# Patient Record
Sex: MALE | Race: WHITE | NOT HISPANIC OR LATINO | Employment: OTHER | URBAN - METROPOLITAN AREA
[De-identification: names, ages, dates, MRNs, and addresses within clinical notes are randomized per-mention and may not be internally consistent; named-entity substitution may affect disease eponyms.]

---

## 2021-08-09 ENCOUNTER — TRANSFERRED RECORDS (OUTPATIENT)
Dept: HEALTH INFORMATION MANAGEMENT | Facility: CLINIC | Age: 66
End: 2021-08-09
Payer: MEDICARE

## 2021-08-09 LAB
ALT SERPL-CCNC: 218 U/L (ref 18–65)
AST SERPL-CCNC: 148 U/L (ref 10–40)
CREATININE (EXTERNAL): 0.84 MG/DL (ref 0.8–1.5)
GFR ESTIMATED (EXTERNAL): >60 ML/MIN/1.73M2
GLUCOSE (EXTERNAL): 90 MG/DL (ref 60–99)
POTASSIUM (EXTERNAL): 3.8 MMOL/L (ref 3.5–5.1)

## 2021-08-23 ENCOUNTER — TRANSFERRED RECORDS (OUTPATIENT)
Dept: HEALTH INFORMATION MANAGEMENT | Facility: CLINIC | Age: 66
End: 2021-08-23
Payer: MEDICARE

## 2021-09-08 ENCOUNTER — TRANSFERRED RECORDS (OUTPATIENT)
Dept: HEALTH INFORMATION MANAGEMENT | Facility: CLINIC | Age: 66
End: 2021-09-08
Payer: MEDICARE

## 2021-09-08 LAB
ALT SERPL-CCNC: 118 U/L (ref 18–65)
AST SERPL-CCNC: 86 U/L (ref 10–40)
CHOLESTEROL (EXTERNAL): 156 MG/DL
CREATININE (EXTERNAL): 0.84 MG/DL (ref 0.8–1.5)
GFR ESTIMATED (EXTERNAL): >60 ML/MIN/1.73M2
GLUCOSE (EXTERNAL): 87 MG/DL (ref 60–99)
HDLC SERPL-MCNC: 52 MG/DL
LDL CHOLESTEROL CALCULATED (EXTERNAL): 88 MG/DL
NON HDL CHOLESTEROL (EXTERNAL): 104 MG/DL
POTASSIUM (EXTERNAL): 4.2 MMOL/L (ref 3.5–5.1)
TRIGLYCERIDES (EXTERNAL): 78 MG/DL

## 2021-10-04 LAB
ALT SERPL-CCNC: 121 U/L (ref 18–65)
AST SERPL-CCNC: 77 U/L (ref 10–40)
CREATININE (EXTERNAL): 0.8 MG/DL (ref 0.8–1.5)
GFR ESTIMATED (EXTERNAL): >60 ML/MIN/1.73M2
GLUCOSE (EXTERNAL): 103 MG/DL (ref 60–99)
POTASSIUM (EXTERNAL): 3.8 MMOL/L (ref 3.5–5.1)

## 2021-11-19 ENCOUNTER — TRANSFERRED RECORDS (OUTPATIENT)
Dept: HEALTH INFORMATION MANAGEMENT | Facility: CLINIC | Age: 66
End: 2021-11-19
Payer: MEDICARE

## 2022-01-31 NOTE — TELEPHONE ENCOUNTER
RECORDS RECEIVED FROM:  Self   Appt Date: 03.03.2022   NOTES STATUS DETAILS   OFFICE NOTES from other specialists Received 03.02.2022 [facility]   MEDICATION LIST Care Everywhere    LABS     HEPATIC PANEL (LIVER PANEL) Received 03.02.2022   BASIC METABOLIC PANEL Received 03.02.2022   COMPLETE METABOLIC PANEL Received 03.02.2022   COMPLETE BLOOD COUNT (CBC) Received 03.02.2022     Action 02.15.2022 RM   Action Taken Called the patient to verify where to get records called [phone] [facility] in [place] called [phone],  Was told to fax request [phone] pending     Action 03.01.2022 RM   Action Taken Called [phone] spoke to a rep who states she will be faxing records over shortly. Records received sent to HIM to be scanned into chart.

## 2022-02-22 ENCOUNTER — TRANSCRIBE ORDERS (OUTPATIENT)
Dept: OTHER | Age: 67
End: 2022-02-22
Payer: MEDICARE

## 2022-02-22 ENCOUNTER — DOCUMENTATION ONLY (OUTPATIENT)
Dept: OTHER | Age: 67
End: 2022-02-22
Payer: MEDICARE

## 2022-02-22 DIAGNOSIS — R91.8 PULMONARY NODULES: Primary | ICD-10-CM

## 2022-02-22 NOTE — PROGRESS NOTES
Action 02/22/2022@ 1127AM CDK   Action Taken CT'S FROM 10/21/2020 TO 07/01/2021 REQUESTED FROM MONSERRAT DOOLEY

## 2022-02-24 DIAGNOSIS — R91.8 PULMONARY NODULES: Primary | ICD-10-CM

## 2022-02-25 NOTE — PROGRESS NOTES
Action February 25, 2022 10:47 AM ABT   Action Taken Images from [place] received and resolve to PACS by another user

## 2022-03-01 DIAGNOSIS — R79.89 ELEVATED LFTS: ICD-10-CM

## 2022-03-01 DIAGNOSIS — Z11.59 ENCOUNTER FOR SCREENING FOR OTHER VIRAL DISEASES: Primary | ICD-10-CM

## 2022-03-03 ENCOUNTER — ANCILLARY PROCEDURE (OUTPATIENT)
Dept: CT IMAGING | Facility: CLINIC | Age: 67
End: 2022-03-03
Attending: CLINICAL NURSE SPECIALIST
Payer: COMMERCIAL

## 2022-03-03 ENCOUNTER — OFFICE VISIT (OUTPATIENT)
Dept: GASTROENTEROLOGY | Facility: CLINIC | Age: 67
End: 2022-03-03
Attending: PHYSICIAN ASSISTANT
Payer: MEDICARE

## 2022-03-03 ENCOUNTER — PRE VISIT (OUTPATIENT)
Dept: GASTROENTEROLOGY | Facility: CLINIC | Age: 67
End: 2022-03-03
Payer: MEDICARE

## 2022-03-03 ENCOUNTER — LAB (OUTPATIENT)
Dept: LAB | Facility: CLINIC | Age: 67
End: 2022-03-03
Attending: PHYSICIAN ASSISTANT
Payer: COMMERCIAL

## 2022-03-03 VITALS
SYSTOLIC BLOOD PRESSURE: 91 MMHG | TEMPERATURE: 98.3 F | BODY MASS INDEX: 25.99 KG/M2 | DIASTOLIC BLOOD PRESSURE: 59 MMHG | WEIGHT: 161.7 LBS | OXYGEN SATURATION: 91 % | RESPIRATION RATE: 18 BRPM | HEART RATE: 88 BPM | HEIGHT: 66 IN

## 2022-03-03 DIAGNOSIS — R91.8 PULMONARY NODULES: ICD-10-CM

## 2022-03-03 DIAGNOSIS — R79.89 ELEVATED LFTS: ICD-10-CM

## 2022-03-03 DIAGNOSIS — Z11.59 ENCOUNTER FOR SCREENING FOR OTHER VIRAL DISEASES: ICD-10-CM

## 2022-03-03 DIAGNOSIS — R79.89 ELEVATED LFTS: Primary | ICD-10-CM

## 2022-03-03 LAB
ALBUMIN SERPL-MCNC: 3.5 G/DL (ref 3.4–5)
ALP SERPL-CCNC: 91 U/L (ref 40–150)
ALT SERPL W P-5'-P-CCNC: 33 U/L (ref 0–70)
ANION GAP SERPL CALCULATED.3IONS-SCNC: 4 MMOL/L (ref 3–14)
AST SERPL W P-5'-P-CCNC: 22 U/L (ref 0–45)
BILIRUB DIRECT SERPL-MCNC: 0.2 MG/DL (ref 0–0.2)
BILIRUB SERPL-MCNC: 0.5 MG/DL (ref 0.2–1.3)
BUN SERPL-MCNC: 12 MG/DL (ref 7–30)
CALCIUM SERPL-MCNC: 8.6 MG/DL (ref 8.5–10.1)
CHLORIDE BLD-SCNC: 109 MMOL/L (ref 94–109)
CO2 SERPL-SCNC: 28 MMOL/L (ref 20–32)
CREAT SERPL-MCNC: 0.81 MG/DL (ref 0.66–1.25)
ERYTHROCYTE [DISTWIDTH] IN BLOOD BY AUTOMATED COUNT: 12.6 % (ref 10–15)
GFR SERPL CREATININE-BSD FRML MDRD: >90 ML/MIN/1.73M2
GLUCOSE BLD-MCNC: 129 MG/DL (ref 70–99)
HCT VFR BLD AUTO: 44.7 % (ref 40–53)
HGB BLD-MCNC: 15 G/DL (ref 13.3–17.7)
MCH RBC QN AUTO: 32.1 PG (ref 26.5–33)
MCHC RBC AUTO-ENTMCNC: 33.6 G/DL (ref 31.5–36.5)
MCV RBC AUTO: 96 FL (ref 78–100)
PLATELET # BLD AUTO: 253 10E3/UL (ref 150–450)
POTASSIUM BLD-SCNC: 3.7 MMOL/L (ref 3.4–5.3)
PROT SERPL-MCNC: 7.1 G/DL (ref 6.8–8.8)
RADIOLOGIST FLAGS: NORMAL
RBC # BLD AUTO: 4.68 10E6/UL (ref 4.4–5.9)
SODIUM SERPL-SCNC: 141 MMOL/L (ref 133–144)
WBC # BLD AUTO: 5.4 10E3/UL (ref 4–11)

## 2022-03-03 PROCEDURE — 87340 HEPATITIS B SURFACE AG IA: CPT | Performed by: PHYSICIAN ASSISTANT

## 2022-03-03 PROCEDURE — 80053 COMPREHEN METABOLIC PANEL: CPT | Performed by: PATHOLOGY

## 2022-03-03 PROCEDURE — 86706 HEP B SURFACE ANTIBODY: CPT | Performed by: PHYSICIAN ASSISTANT

## 2022-03-03 PROCEDURE — 71250 CT THORAX DX C-: CPT | Mod: GC | Performed by: RADIOLOGY

## 2022-03-03 PROCEDURE — 86704 HEP B CORE ANTIBODY TOTAL: CPT | Performed by: PHYSICIAN ASSISTANT

## 2022-03-03 PROCEDURE — 85027 COMPLETE CBC AUTOMATED: CPT | Performed by: PATHOLOGY

## 2022-03-03 PROCEDURE — 82248 BILIRUBIN DIRECT: CPT | Performed by: PATHOLOGY

## 2022-03-03 PROCEDURE — 36415 COLL VENOUS BLD VENIPUNCTURE: CPT | Performed by: PATHOLOGY

## 2022-03-03 PROCEDURE — 86803 HEPATITIS C AB TEST: CPT | Performed by: PHYSICIAN ASSISTANT

## 2022-03-03 PROCEDURE — 99204 OFFICE O/P NEW MOD 45 MIN: CPT | Performed by: PHYSICIAN ASSISTANT

## 2022-03-03 PROCEDURE — 999N000103 HC STATISTIC NO CHARGE FACILITY FEE

## 2022-03-03 RX ORDER — ALBUTEROL SULFATE 90 UG/1
2 AEROSOL, METERED RESPIRATORY (INHALATION) 4 TIMES DAILY PRN
COMMUNITY

## 2022-03-03 RX ORDER — FINASTERIDE 5 MG/1
1 TABLET, FILM COATED ORAL DAILY
COMMUNITY
Start: 2022-02-21

## 2022-03-03 RX ORDER — CETIRIZINE HYDROCHLORIDE 10 MG/1
10 TABLET ORAL DAILY PRN
COMMUNITY

## 2022-03-03 RX ORDER — BUDESONIDE AND FORMOTEROL FUMARATE DIHYDRATE 80; 4.5 UG/1; UG/1
2 AEROSOL RESPIRATORY (INHALATION) 2 TIMES DAILY
COMMUNITY

## 2022-03-03 RX ORDER — MODAFINIL 200 MG/1
200 TABLET ORAL DAILY
COMMUNITY

## 2022-03-03 RX ORDER — TAMSULOSIN HYDROCHLORIDE 0.4 MG/1
2 CAPSULE ORAL DAILY
COMMUNITY
Start: 2022-01-07

## 2022-03-03 RX ORDER — FLUOXETINE 10 MG/1
10 TABLET ORAL DAILY
COMMUNITY

## 2022-03-03 ASSESSMENT — PAIN SCALES - GENERAL: PAINLEVEL: NO PAIN (0)

## 2022-03-03 NOTE — PROGRESS NOTES
Hepatology Clinic note  Vance Mcdonnell   Date of Birth 1955  Date of Service 3/3/2022    REASON FOR CONSULTATION: elevated LFT's  REFERRING PROVIDER:  Self-referred         Assessment/plan:   Vance Mcdonnell is a 66 year old male with history of elevated transaminases first noted in August 2021, at which time ALT was 218 and AST was 148.  Trended down in October but were still mildly elevated.  Today patient's transaminases are normal, with an ALT 33 and AST of 22. He does not have any stigmata of advanced liver disease. We discussed etiologies of acute transaminitis as well as the work-up with chronically elevated transaminases.  Overall, it is not clear what the cause elevated transaminases this fall, but it is reassuring that his levels are normal today and historically they have been normal. Do not think that extensive hepatobiliary work-up is necessary aside from hepatitis B and C serologies. He does have Chest CT later today. Offered FibroScan today to assess baseline fibrosis, but he declines.     - Hepatitis B and C serologies pending today   - Recommend routine follow up with PCP (check hepatic panel once yearly)   - Follow-up in clinic with hepatology as needed    Caleb Stoddard PA-C   [facility] Hepatology     Total time for E/M services performed on the date of the encounter 45 minutes.  This included review of previous: clinic visits, hospital records, lab results, imaging studies, and procedural documentation. Time also includes patient visit, documentation.  The findings from this review are summarized in the above note.   -----------------------------------------------------       HPI:   Vance Mcdonnell is a 66 year old male presenting for the evaluation of elevated LFT's.     August 2021, patient was noted to have elevated ALT of 218 and AST of 148.  Labs in October 2021 showing  and AST of 78.  Total bilirubin and albumin normal range. In December 2020, ALT 27, AST 25 and alk  phos 80.  Historically labs dating back to 2005 were normal.     No recent abdominal pain. He denies any recent upper respiratory or gastrointestinal illness for any prolonged period of time. He did receive his Covid booster in October 2021. No other recent vaccinations.    Appetite is good. Does not follow a particular diet. Weight has been stable within 5 lbs .   Activities include snow-blowing, but otherwise no routine exercise.      Patient denies jaundice, lower extremity edema, abdominal distension or confusion.  Patient also denies melena, hematochezia or hematemesis. Patient denies weight loss, fevers, sweats or chills.    PMH: COPD, pulmonology nodules, BPH, history of pleurisy, hard of hearing     SMH: none     Medications:   Albuterol  Symbicort  Zyrtec   Vitamin D  Finasteride   Fluoxetine  Modafinil  Tamsulosin    Quit 20 years ago. Last alcohol 1986.  No previous IV/IN drug use. Previously, pulp making at . Patient currently live with wife and dog (16.5). Worked in [employer] in the [facility] [facility].  No known family history of liver disease or liver cancer.      Previous work-up:  Triglycerides 78  Cholesterol 156  Hepatitis B serology pending today  Hepatitis C antibody pending  Hemoglobin A1c 5.3    Medical hx Surgical hx   No past medical history on file. No past surgical history on file.              Medications:     No current outpatient medications on file.     No current facility-administered medications for this visit.            Allergies:   Not on File         Social History:     Social History     Socioeconomic History     Marital status:      Spouse name: Not on file     Number of children: Not on file     Years of education: Not on file     Highest education level: Not on file   Occupational History     Not on file   Tobacco Use     Smoking status: Not on file     Smokeless tobacco: Not on file   Substance and Sexual Activity     Alcohol use: Not on file     Drug use: Not on file      "Sexual activity: Not on file   Other Topics Concern     Not on file   Social History Narrative     Not on file     Social Determinants of Health     Financial Resource Strain: Not on file   Food Insecurity: Not on file   Transportation Needs: Not on file   Physical Activity: Not on file   Stress: Not on file   Social Connections: Not on file   Intimate Partner Violence: Not on file   Housing Stability: Not on file            Family History:   No family history on file.           Review of Systems:   Gen: See HPI     HEENT: No change in vision or hearing, mouth sores, dysphagia, lymph nodes  Resp: No shortness of breath, coughing, hx of asthma  CV: No chest pain, palpitations, syncope   GI: See HPI  : No dysuria, history of stones, urine color    Skin: No rash; no pruritus or psoriasis  MS: No arthralgias, myalgias, joint swelling  Neuro: No memory changes, confusion, numbness    Heme: No difficulty clotting, bruising, bleeding  Psych:  No anxiety, depression, agitation          Physical Exam:   BP 91/59 (BP Location: Right arm, Patient Position: Sitting, Cuff Size: Adult Regular)   Pulse 88   Temp 98.3  F (36.8  C) (Oral)   Resp 18   Ht 1.676 m (5' 6\")   Wt 73.3 kg (161 lb 11.2 oz)   SpO2 91%   BMI 26.10 kg/m      Gen: A&Ox3, NAD, well developed  HEENT: non-icteric  CV: RRR, no overt murmurs  Lung: CTA Bilatererally, no wheezing or crackles.   Lym- no palpable lymphadenopathy  Abd: soft, NT, ND,  no palpable splenomegaly, liver is not palpable.   Ext: no edema, intact pulses.   Skin: No rash,  no palmar erythema, telangiectasias or jaundice  Neuro: grossly intact, no asterixis   Psych: appropriate mood and affects         Data:   Reviewed in person and significant for:    No results found for: NA   No results found for: POTASSIUM  No results found for: CHLORIDE  No results found for: CO2  No results found for: BUN  No results found for: CR    Lab Results   Component Value Date    WBC 5.4 03/03/2022     Lab " Results   Component Value Date    HGB 15.0 03/03/2022     Lab Results   Component Value Date    HCT 44.7 03/03/2022     Lab Results   Component Value Date    MCV 96 03/03/2022     Lab Results   Component Value Date     03/03/2022       No results found for: AST  No results found for: ALT  No results found for: BILICONJ   No results found for: BILITOTAL    No results found for: ALBUMIN  No results found for: PROTTOTAL   No results found for: ALKPHOS    No results found for: INR      Imaging:      CT ABDOMEN PELVIS W IV CONTRAST       CLINICAL HISTORY:  Abnormal weight gain; persistent weight loss.       TECHNIQUE: CT of the abdomen and pelvis was performed with both oral and intravenous contrast.     FINDINGS: There is no focal suspicious hepatic, splenic, pancreatic, or right adrenal lesion. A small left adrenal nodule, measures up to 1.6 cm, without increasing size in comparison with a CT of the chest dated 10/05/2020. The lesion contains fat on the prior noncontrast study, most consistent with a small adrenal adenoma.     No calcified ureteral stone or hydronephrosis. No focal suspicious renal lesion. The urinary bladder is unremarkable in appearance.     Multiple colonic diverticuli are noted without evidence of acute diverticulitis. There is no bowel obstruction. A moderate amount of stool is present. No free abdominal or pelvic fluid.     There is no intraabdominal or pelvic lymphadenopathy.     No focal suspicious destructive bone lesion. There is a 3 mm peripheral nodule in the right lung base on sequence 3 image 8. There is a 7 to 8 mm nodule in the left lung base on image, not significantly changed in comparison with a CT chest dated 10/5/2020.     IMPRESSION:   1. A small left adrenal nodule up to 1.6 cm, likely a small adrenal adenoma. No intraabdominal or pelvic mass or lymphadenopathy.   2. Colonic diverticulosis without acute diverticulitis. No bowel obstruction or intraabdominal or pelvic  inflammation.   3. A 7 to 8 mm nodule in the left lung base, without significant change.

## 2022-03-03 NOTE — LETTER
RE: Vance Mcdonnell  [address] [facility] [address]    Hepatology Clinic note  Vance Mcdonnell   Date of Birth 1955  Date of Service 3/3/2022    REASON FOR CONSULTATION: elevated LFT's  REFERRING PROVIDER:  Self-referred         Assessment/plan:   Vance Mcdonnell is a 66 year old male with history of elevated transaminases first noted in August 2021, at which time ALT was 218 and AST was 148.  Trended down in October but were still mildly elevated.  Today patient's transaminases are normal, with an ALT 33 and AST of 22. He does not have any stigmata of advanced liver disease. We discussed etiologies of acute transaminitis as well as the work-up with chronically elevated transaminases.  Overall, it is not clear what the cause elevated transaminases this fall, but it is reassuring that his levels are normal today and historically they have been normal. Do not think that extensive hepatobiliary work-up is necessary aside from hepatitis B and C serologies. He does have Chest CT later today. Offered FibroScan today to assess baseline fibrosis, but he declines.     - Hepatitis B and C serologies pending today   - Recommend routine follow up with PCP (check hepatic panel once yearly)   - Follow-up in clinic with hepatology as needed    Caleb Stoddard PA-C   [facility] Hepatology     Total time for E/M services performed on the date of the encounter 45 minutes.  This included review of previous: clinic visits, hospital records, lab results, imaging studies, and procedural documentation. Time also includes patient visit, documentation.  The findings from this review are summarized in the above note.   -----------------------------------------------------       HPI:   Vance Mcdonnell is a 66 year old male presenting for the evaluation of elevated LFT's.     August 2021, patient was noted to have elevated ALT of 218 and AST of 148.  Labs in October 2021 showing  and AST of 78.  Total bilirubin and  albumin normal range. In December 2020, ALT 27, AST 25 and alk phos 80.  Historically labs dating back to 2005 were normal.     No recent abdominal pain. He denies any recent upper respiratory or gastrointestinal illness for any prolonged period of time. He did receive his Covid booster in October 2021. No other recent vaccinations.    Appetite is good. Does not follow a particular diet. Weight has been stable within 5 lbs .   Activities include snow-blowing, but otherwise no routine exercise.      Patient denies jaundice, lower extremity edema, abdominal distension or confusion.  Patient also denies melena, hematochezia or hematemesis. Patient denies weight loss, fevers, sweats or chills.    PMH: COPD, pulmonology nodules, BPH, history of pleurisy, hard of hearing     SMH: none     Medications:   Albuterol  Symbicort  Zyrtec   Vitamin D  Finasteride   Fluoxetine  Modafinil  Tamsulosin    Quit 20 years ago. Last alcohol 1986.  No previous IV/IN drug use. Previously, pulp making at . Patient currently live with wife and dog (16.5). Worked in [employer] in the [facility] [employer] [facility].  No known family history of liver disease or liver cancer.      Previous work-up:  Triglycerides 78  Cholesterol 156  Hepatitis B serology pending today  Hepatitis C antibody pending  Hemoglobin A1c 5.3    Medical hx Surgical hx   No past medical history on file. No past surgical history on file.            Medications:     No current outpatient medications on file.     No current facility-administered medications for this visit.          Allergies:   Not on File         Social History:     Social History     Socioeconomic History     Marital status:      Spouse name: Not on file     Number of children: Not on file     Years of education: Not on file     Highest education level: Not on file   Occupational History     Not on file   Tobacco Use     Smoking status: Not on file     Smokeless tobacco: Not on file   Substance and Sexual Activity      "Alcohol use: Not on file     Drug use: Not on file     Sexual activity: Not on file   Other Topics Concern     Not on file   Social History Narrative     Not on file     Social Determinants of Health     Financial Resource Strain: Not on file   Food Insecurity: Not on file   Transportation Needs: Not on file   Physical Activity: Not on file   Stress: Not on file   Social Connections: Not on file   Intimate Partner Violence: Not on file   Housing Stability: Not on file          Family History:   No family history on file.         Review of Systems:   Gen: See HPI     HEENT: No change in vision or hearing, mouth sores, dysphagia, lymph nodes  Resp: No shortness of breath, coughing, hx of asthma  CV: No chest pain, palpitations, syncope   GI: See HPI  : No dysuria, history of stones, urine color    Skin: No rash; no pruritus or psoriasis  MS: No arthralgias, myalgias, joint swelling  Neuro: No memory changes, confusion, numbness    Heme: No difficulty clotting, bruising, bleeding  Psych:  No anxiety, depression, agitation          Physical Exam:   BP 91/59 (BP Location: Right arm, Patient Position: Sitting, Cuff Size: Adult Regular)   Pulse 88   Temp 98.3  F (36.8  C) (Oral)   Resp 18   Ht 1.676 m (5' 6\")   Wt 73.3 kg (161 lb 11.2 oz)   SpO2 91%   BMI 26.10 kg/m      Gen: A&Ox3, NAD, well developed  HEENT: non-icteric  CV: RRR, no overt murmurs  Lung: CTA Bilatererally, no wheezing or crackles.   Lym- no palpable lymphadenopathy  Abd: soft, NT, ND,  no palpable splenomegaly, liver is not palpable.   Ext: no edema, intact pulses.   Skin: No rash,  no palmar erythema, telangiectasias or jaundice  Neuro: grossly intact, no asterixis   Psych: appropriate mood and affects         Data:   Reviewed in person and significant for:    No results found for: NA   No results found for: POTASSIUM  No results found for: CHLORIDE  No results found for: CO2  No results found for: BUN  No results found for: CR    Lab Results "   Component Value Date    WBC 5.4 03/03/2022     Lab Results   Component Value Date    HGB 15.0 03/03/2022     Lab Results   Component Value Date    HCT 44.7 03/03/2022     Lab Results   Component Value Date    MCV 96 03/03/2022     Lab Results   Component Value Date     03/03/2022     No results found for: AST  No results found for: ALT  No results found for: BILICONJ   No results found for: BILITOTAL    No results found for: ALBUMIN  No results found for: PROTTOTAL   No results found for: ALKPHOS    No results found for: INR      Imaging:      CT ABDOMEN PELVIS W IV CONTRAST       CLINICAL HISTORY:  Abnormal weight gain; persistent weight loss.       TECHNIQUE: CT of the abdomen and pelvis was performed with both oral and intravenous contrast.     FINDINGS: There is no focal suspicious hepatic, splenic, pancreatic, or right adrenal lesion. A small left adrenal nodule, measures up to 1.6 cm, without increasing size in comparison with a CT of the chest dated 10/05/2020. The lesion contains fat on the prior noncontrast study, most consistent with a small adrenal adenoma.     No calcified ureteral stone or hydronephrosis. No focal suspicious renal lesion. The urinary bladder is unremarkable in appearance.     Multiple colonic diverticuli are noted without evidence of acute diverticulitis. There is no bowel obstruction. A moderate amount of stool is present. No free abdominal or pelvic fluid.     There is no intraabdominal or pelvic lymphadenopathy.     No focal suspicious destructive bone lesion. There is a 3 mm peripheral nodule in the right lung base on sequence 3 image 8. There is a 7 to 8 mm nodule in the left lung base on image, not significantly changed in comparison with a CT chest dated 10/5/2020.     IMPRESSION:   1. A small left adrenal nodule up to 1.6 cm, likely a small adrenal adenoma. No intraabdominal or pelvic mass or lymphadenopathy.   2. Colonic diverticulosis without acute diverticulitis. No  bowel obstruction or intraabdominal or pelvic inflammation.   3. A 7 to 8 mm nodule in the left lung base, without significant change.        Caleb Stoddard, PA-C

## 2022-03-03 NOTE — LETTER
3/3/2022         RE: Vance Mcdonnell  [address]  [facility] [address]        Dear Colleague,    Thank you for referring your patient, Vance Mcdonnell, to the [facility] [place]. Please see a copy of my visit note below.    Hepatology Clinic note  Vance Mcdonnell   Date of Birth 1955  Date of Service 3/3/2022    REASON FOR CONSULTATION: elevated LFT's  REFERRING PROVIDER:  Self-referred         Assessment/plan:   Vance Mcdonnell is a 66 year old male with history of elevated transaminases first noted in August 2021, at which time ALT was 218 and AST was 148.  Trended down in October but were still mildly elevated.  Today patient's transaminases are normal, with an ALT 33 and AST of 22. He does not have any stigmata of advanced liver disease. We discussed etiologies of acute transaminitis as well as the work-up with chronically elevated transaminases.  Overall, it is not clear what the cause elevated transaminases this fall, but it is reassuring that his levels are normal today and historically they have been normal. Do not think that extensive hepatobiliary work-up is necessary aside from hepatitis B and C serologies. He does have Chest CT later today. Offered FibroScan today to assess baseline fibrosis, but he declines.     - Hepatitis B and C serologies pending today   - Recommend routine follow up with PCP (check hepatic panel once yearly)   - Follow-up in clinic with hepatology as needed    Caleb Stoddard PA-C   [facility]     Total time for E/M services performed on the date of the encounter 45 minutes.  This included review of previous: clinic visits, hospital records, lab results, imaging studies, and procedural documentation. Time also includes patient visit, documentation.  The findings from this review are summarized in the above note.   -----------------------------------------------------       HPI:   Vance Mcdonnell is a 66 year old male  presenting for the evaluation of elevated LFT's.     August 2021, patient was noted to have elevated ALT of 218 and AST of 148.  Labs in October 2021 showing  and AST of 78.  Total bilirubin and albumin normal range. In December 2020, ALT 27, AST 25 and alk phos 80.  Historically labs dating back to 2005 were normal.     No recent abdominal pain. He denies any recent upper respiratory or gastrointestinal illness for any prolonged period of time. He did receive his Covid booster in October 2021. No other recent vaccinations.    Appetite is good. Does not follow a particular diet. Weight has been stable within 5 lbs .   Activities include snow-blowing, but otherwise no routine exercise.      Patient denies jaundice, lower extremity edema, abdominal distension or confusion.  Patient also denies melena, hematochezia or hematemesis. Patient denies weight loss, fevers, sweats or chills.    PMH: COPD, pulmonology nodules, BPH, history of pleurisy, hard of hearing     SMH: none     Medications:   Albuterol  Symbicort  Zyrtec   Vitamin D  Finasteride   Fluoxetine  Modafinil  Tamsulosin    Quit 20 years ago. Last alcohol 1986.  No previous IV/IN drug use. Previously, pulp making at . Patient currently live with wife and dog (16.5). Worked in [employer] in the [employer] [facility].  No known family history of liver disease or liver cancer.      Previous work-up:  Triglycerides 78  Cholesterol 156  Hepatitis B serology pending today  Hepatitis C antibody pending  Hemoglobin A1c 5.3    Medical hx Surgical hx   No past medical history on file. No past surgical history on file.              Medications:     No current outpatient medications on file.     No current facility-administered medications for this visit.            Allergies:   Not on File         Social History:     Social History     Socioeconomic History     Marital status:      Spouse name: Not on file     Number of children: Not on file     Years of education: Not  "on file     Highest education level: Not on file   Occupational History     Not on file   Tobacco Use     Smoking status: Not on file     Smokeless tobacco: Not on file   Substance and Sexual Activity     Alcohol use: Not on file     Drug use: Not on file     Sexual activity: Not on file   Other Topics Concern     Not on file   Social History Narrative     Not on file     Social Determinants of Health     Financial Resource Strain: Not on file   Food Insecurity: Not on file   Transportation Needs: Not on file   Physical Activity: Not on file   Stress: Not on file   Social Connections: Not on file   Intimate Partner Violence: Not on file   Housing Stability: Not on file            Family History:   No family history on file.           Review of Systems:   Gen: See HPI     HEENT: No change in vision or hearing, mouth sores, dysphagia, lymph nodes  Resp: No shortness of breath, coughing, hx of asthma  CV: No chest pain, palpitations, syncope   GI: See HPI  : No dysuria, history of stones, urine color    Skin: No rash; no pruritus or psoriasis  MS: No arthralgias, myalgias, joint swelling  Neuro: No memory changes, confusion, numbness    Heme: No difficulty clotting, bruising, bleeding  Psych:  No anxiety, depression, agitation          Physical Exam:   BP 91/59 (BP Location: Right arm, Patient Position: Sitting, Cuff Size: Adult Regular)   Pulse 88   Temp 98.3  F (36.8  C) (Oral)   Resp 18   Ht 1.676 m (5' 6\")   Wt 73.3 kg (161 lb 11.2 oz)   SpO2 91%   BMI 26.10 kg/m      Gen: A&Ox3, NAD, well developed  HEENT: non-icteric  CV: RRR, no overt murmurs  Lung: CTA Bilatererally, no wheezing or crackles.   Lym- no palpable lymphadenopathy  Abd: soft, NT, ND,  no palpable splenomegaly, liver is not palpable.   Ext: no edema, intact pulses.   Skin: No rash,  no palmar erythema, telangiectasias or jaundice  Neuro: grossly intact, no asterixis   Psych: appropriate mood and affects         Data:   Reviewed in person and " significant for:    No results found for: NA   No results found for: POTASSIUM  No results found for: CHLORIDE  No results found for: CO2  No results found for: BUN  No results found for: CR    Lab Results   Component Value Date    WBC 5.4 03/03/2022     Lab Results   Component Value Date    HGB 15.0 03/03/2022     Lab Results   Component Value Date    HCT 44.7 03/03/2022     Lab Results   Component Value Date    MCV 96 03/03/2022     Lab Results   Component Value Date     03/03/2022       No results found for: AST  No results found for: ALT  No results found for: BILICONJ   No results found for: BILITOTAL    No results found for: ALBUMIN  No results found for: PROTTOTAL   No results found for: ALKPHOS    No results found for: INR      Imaging:      CT ABDOMEN PELVIS W IV CONTRAST       CLINICAL HISTORY:  Abnormal weight gain; persistent weight loss.       TECHNIQUE: CT of the abdomen and pelvis was performed with both oral and intravenous contrast.     FINDINGS: There is no focal suspicious hepatic, splenic, pancreatic, or right adrenal lesion. A small left adrenal nodule, measures up to 1.6 cm, without increasing size in comparison with a CT of the chest dated 10/05/2020. The lesion contains fat on the prior noncontrast study, most consistent with a small adrenal adenoma.     No calcified ureteral stone or hydronephrosis. No focal suspicious renal lesion. The urinary bladder is unremarkable in appearance.     Multiple colonic diverticuli are noted without evidence of acute diverticulitis. There is no bowel obstruction. A moderate amount of stool is present. No free abdominal or pelvic fluid.     There is no intraabdominal or pelvic lymphadenopathy.     No focal suspicious destructive bone lesion. There is a 3 mm peripheral nodule in the right lung base on sequence 3 image 8. There is a 7 to 8 mm nodule in the left lung base on image, not significantly changed in comparison with a CT chest dated 10/5/2020.      IMPRESSION:   1. A small left adrenal nodule up to 1.6 cm, likely a small adrenal adenoma. No intraabdominal or pelvic mass or lymphadenopathy.   2. Colonic diverticulosis without acute diverticulitis. No bowel obstruction or intraabdominal or pelvic inflammation.   3. A 7 to 8 mm nodule in the left lung base, without significant change.          Again, thank you for allowing me to participate in the care of your patient.        Sincerely,        Caleb Stoddard PA-C

## 2022-03-03 NOTE — LETTER
March 4, 2022       TO: Vance HOWELL Sathya  [address] [address]       Dear Mr. Mcdonnell,    We are writing to inform you of your test results.    You do not have Hepatitis B or C.   You are immune to Hepatitis B through vaccination.     It was a pleasure to see you at your recent visit. Please let me know if you have any questions or concerns.     Clinic Staff - [phone] option 3     Sincerely,     NATALIYA Jackman  [address] [facility] [address], [id number]  [place]  [address].      Resulted Orders   Basic metabolic panel   Result Value Ref Range    Sodium 141 133 - 144 mmol/L    Potassium 3.7 3.4 - 5.3 mmol/L    Chloride 109 94 - 109 mmol/L    Carbon Dioxide (CO2) 28 20 - 32 mmol/L    Anion Gap 4 3 - 14 mmol/L    Urea Nitrogen 12 7 - 30 mg/dL    Creatinine 0.81 0.66 - 1.25 mg/dL    Calcium 8.6 8.5 - 10.1 mg/dL    Glucose 129 (H) 70 - 99 mg/dL    GFR Estimate >90 >60 mL/min/1.73m2      Comment:      Effective December 21, 2021 eGFRcr in adults is calculated using the 2021 CKD-EPI creatinine equation which includes age and gender (Starr et al., NEJ, DOI: 10.1056/OXUZpf2490097)   CBC with platelets   Result Value Ref Range    WBC Count 5.4 4.0 - 11.0 10e3/uL    RBC Count 4.68 4.40 - 5.90 10e6/uL    Hemoglobin 15.0 13.3 - 17.7 g/dL    Hematocrit 44.7 40.0 - 53.0 %    MCV 96 78 - 100 fL    MCH 32.1 26.5 - 33.0 pg    MCHC 33.6 31.5 - 36.5 g/dL    RDW 12.6 10.0 - 15.0 %    Platelet Count 253 150 - 450 10e3/uL   Hepatic function panel   Result Value Ref Range    Bilirubin Total 0.5 0.2 - 1.3 mg/dL    Bilirubin Direct 0.2 0.0 - 0.2 mg/dL    Protein Total 7.1 6.8 - 8.8 g/dL    Albumin 3.5 3.4 - 5.0 g/dL    Alkaline Phosphatase 91 40 - 150 U/L    AST 22 0 - 45 U/L    ALT 33 0 - 70 U/L   Hepatitis C Screen Reflex to HCV RNA Quant and Genotype   Result Value Ref Range    Hepatitis C Antibody Nonreactive Nonreactive    Narrative    Assay performance characteristics have not been established for newborns,  infants, and children.   Hepatitis B Surface Antibody   Result Value Ref Range    Hepatitis B Surface Antibody 143.96 >=12.00 m[IU]/mL      Comment:      Reactive, Patient is considered to be immune to infection with hepatitis B when the value is greater than or equal to 12.0 mIU/mL.   Hepatitis B core antibody   Result Value Ref Range    Hepatitis B Core Antibody Total Nonreactive Nonreactive   Hepatitis B surface antigen   Result Value Ref Range    Hepatitis B Surface Antigen Nonreactive Nonreactive

## 2022-03-04 LAB
HBV CORE AB SERPL QL IA: NONREACTIVE
HBV SURFACE AB SERPL IA-ACNC: 143.96 M[IU]/ML
HBV SURFACE AG SERPL QL IA: NONREACTIVE
HCV AB SERPL QL IA: NONREACTIVE

## 2022-03-15 NOTE — PROGRESS NOTES
Vance is a 66 year old who is being evaluated via a billable video visit.  Currently in [facility].    How would you like to obtain your AVS? Mail a copy  If the video visit is dropped, the invitation should be resent by: Send to e-mail at: [email or web address]  Will anyone else be joining your video visit? No       LEXIE Alvarez    Video Start Time: 12:38 PM  Video-Visit Details    Type of service:  Video Visit    Video End Time: 12:48 PM    Originating Location (pt. Location): Home    Distant Location (provider location):  [facility]     Platform used for Video Visit: [email or web address]      LUNG NODULE & INTERVENTIONAL PULMONARY CLINIC  [facility]     Vance Mcdonnell MRN# [id number]   Age: 66 year old YOB: 1955     Reason for Consultation: Pulmonary nodule    Requesting Physician: No referring provider defined for this encounter.       Assessment and Plan:    1. New 7mm LLL nodule  New since 7/1/2021. The remainder of nodules are stable. Discussed possible etiology of new nodule including inflammatory vs scarring vs malignancy and recommended continued surveillance.   --Repeat CT scan in 3 months    Elisabeth Sutherland MD  Interventional Pulmonology  Department of Pulmonary, Allergy, Critical Care and Sleep Medicine   [facility]           History:     Vance Mcdonnell is a 66 year old male with sig h/o for COPD who is here for pulmonary nodules.    [facility] with [facility]. Is on Symbicort and albuterol.     Patient has previously followed with [facility] for pulmonary nodules and has CT scans dating back to at least 10/2020. He last saw a pulmonologist at [facility] 7/2021 with plans for repeat scan in 6 months for stable nodules but patient wanted a second opinion because he is worried for lung cancer.     - Personal hx of cancer: None  - Family hx of cancer: Patient is unsure.  - Exposure  hx: Retired, used to work in [employer] with exposure to dust and chemicals.  - Tobacco hx: Quit around , smoked for at least 23 years, 1ppd  - My interpretation of the images relevant for this visit includes: New 7mm LLL nodule  - My interpretation of the PFT's relevant for this visit includes: None available for review.          Past Medical History:    COPD         Past Surgical History:    None         Social History:     Social History     Tobacco Use     Smoking status: Former Smoker     Quit date: 2002     Years since quittin.2     Smokeless tobacco: Never Used   Substance Use Topics     Alcohol use: Not Currently          Family History:   Patient is unsure of his family history.          Allergies:      Allergies   Allergen Reactions     Pine Tar      Other reaction(s): Other (see comments)   RED PINE per pt Congestion, sneezing          Medications:     Current Outpatient Medications   Medication Sig     albuterol (PROAIR HFA/PROVENTIL HFA/VENTOLIN HFA) 108 (90 Base) MCG/ACT inhaler Inhale 2 puffs into the lungs 4 times daily as needed for shortness of breath / dyspnea     budesonide-formoterol (SYMBICORT) 80-4.5 MCG/ACT Inhaler Inhale 2 puffs into the lungs 2 times daily     cetirizine (ZYRTEC) 10 MG tablet Take 10 mg by mouth daily as needed     cholecalciferol 25 MCG (1000 UT) TABS Take 25 mcg by mouth daily     finasteride (PROSCAR) 5 MG tablet Take 1 tablet by mouth daily     FLUoxetine HCl, PMDD, 10 MG TABS Take 10 mg by mouth daily     modafinil (PROVIGIL) 200 MG tablet Take 200 mg by mouth daily     tamsulosin (FLOMAX) 0.4 MG capsule Take 2 capsules by mouth daily     No current facility-administered medications for this visit.            Physical Exam:   GENERAL: Healthy, alert and no distress  EYES: Eyes grossly normal to inspection.  No discharge or erythema, or obvious scleral/conjunctival abnormalities.  RESP: No audible wheeze, cough, or visible cyanosis.  No visible retractions  or increased work of breathing.    SKIN: Visible skin clear. No significant rash, abnormal pigmentation or lesions.  NEURO: Cranial nerves grossly intact.  Mentation and speech appropriate for age.  PSYCH: Mentation appears normal, affect normal/bright, judgement and insight intact, normal speech and appearance well-groomed.     Imaging/Lab Data   All laboratory and imaging data reviewed.

## 2022-03-17 ENCOUNTER — VIRTUAL VISIT (OUTPATIENT)
Dept: PULMONOLOGY | Facility: CLINIC | Age: 67
End: 2022-03-17
Attending: STUDENT IN AN ORGANIZED HEALTH CARE EDUCATION/TRAINING PROGRAM
Payer: COMMERCIAL

## 2022-03-17 DIAGNOSIS — R91.8 PULMONARY NODULES: ICD-10-CM

## 2022-03-17 PROCEDURE — 99204 OFFICE O/P NEW MOD 45 MIN: CPT | Mod: 95 | Performed by: STUDENT IN AN ORGANIZED HEALTH CARE EDUCATION/TRAINING PROGRAM

## 2022-03-17 NOTE — LETTER
3/17/2022       RE: Vnace Mcdonnell  [address]  [facility] [address]     Dear Colleague,    Thank you for referring your patient, Vnace Mcdonnell, to the [facility]INIC at [facility]. Please see a copy of my visit note below.    Vance is a 66 year old who is being evaluated via a billable video visit.  Currently in [facility] [place].    How would you like to obtain your AVS? Mail a copy  If the video visit is dropped, the invitation should be resent by: Send to e-mail at: [email or web address]  Will anyone else be joining your video visit? No       LEXIE Alvarez    Video Start Time: 12:38 PM  Video-Visit Details    Type of service:  Video Visit    Video End Time: 12:48 PM    Originating Location (pt. Location): Home    Distant Location (provider location):  [facility]     Platform used for Video Visit: [employer]      LUNG NODULE & INTERVENTIONAL PULMONARY CLINIC  [facility]     Vance Mcdonnell MRN# [id number]   Age: 66 year old YOB: 1955     Reason for Consultation: Pulmonary nodule    Requesting Physician: No referring provider defined for this encounter.       Assessment and Plan:    1. New 7mm LLL nodule  New since 7/1/2021. The remainder of nodules are stable. Discussed possible etiology of new nodule including inflammatory vs scarring vs malignancy and recommended continued surveillance.   --Repeat CT scan in 3 months    Elisabeth Sutherland MD  Interventional Pulmonology  Department of Pulmonary, Allergy, Critical Care and Sleep Medicine   [facility]           History:     Vance Mcdonnell is a 66 year old male with sig h/o for COPD who is here for pulmonary nodules.    [facility] with [facility]. Is on Symbicort and albuterol.     Patient has previously followed with [facility] for pulmonary nodules and has CT  scans dating back to at least 10/2020. He last saw a pulmonologist at [facility] 2021 with plans for repeat scan in 6 months for stable nodules but patient wanted a second opinion because he is worried for lung cancer.     - Personal hx of cancer: None  - Family hx of cancer: Patient is unsure.  - Exposure hx: Retired, used to work in [employer] with exposure to dust and chemicals.  - Tobacco hx: Quit around , smoked for at least 23 years, 1ppd  - My interpretation of the images relevant for this visit includes: New 7mm LLL nodule  - My interpretation of the PFT's relevant for this visit includes: None available for review.          Past Medical History:    COPD         Past Surgical History:    None         Social History:     Social History     Tobacco Use     Smoking status: Former Smoker     Quit date: 2002     Years since quittin.2     Smokeless tobacco: Never Used   Substance Use Topics     Alcohol use: Not Currently          Family History:   Patient is unsure of his family history.          Allergies:      Allergies   Allergen Reactions     Pine Tar      Other reaction(s): Other (see comments)   RED PINE per pt Congestion, sneezing          Medications:     Current Outpatient Medications   Medication Sig     albuterol (PROAIR HFA/PROVENTIL HFA/VENTOLIN HFA) 108 (90 Base) MCG/ACT inhaler Inhale 2 puffs into the lungs 4 times daily as needed for shortness of breath / dyspnea     budesonide-formoterol (SYMBICORT) 80-4.5 MCG/ACT Inhaler Inhale 2 puffs into the lungs 2 times daily     cetirizine (ZYRTEC) 10 MG tablet Take 10 mg by mouth daily as needed     cholecalciferol 25 MCG (1000 UT) TABS Take 25 mcg by mouth daily     finasteride (PROSCAR) 5 MG tablet Take 1 tablet by mouth daily     FLUoxetine HCl, PMDD, 10 MG TABS Take 10 mg by mouth daily     modafinil (PROVIGIL) 200 MG tablet Take 200 mg by mouth daily     tamsulosin (FLOMAX) 0.4 MG capsule Take 2 capsules by mouth daily     No current  facility-administered medications for this visit.            Physical Exam:   GENERAL: Healthy, alert and no distress  EYES: Eyes grossly normal to inspection.  No discharge or erythema, or obvious scleral/conjunctival abnormalities.  RESP: No audible wheeze, cough, or visible cyanosis.  No visible retractions or increased work of breathing.    SKIN: Visible skin clear. No significant rash, abnormal pigmentation or lesions.  NEURO: Cranial nerves grossly intact.  Mentation and speech appropriate for age.  PSYCH: Mentation appears normal, affect normal/bright, judgement and insight intact, normal speech and appearance well-groomed.     Imaging/Lab Data   All laboratory and imaging data reviewed.         Again, thank you for allowing me to participate in the care of your patient.      Sincerely,    Elisabeth Sutherland MD